# Patient Record
Sex: FEMALE | Race: WHITE | Employment: PART TIME | URBAN - NONMETROPOLITAN AREA
[De-identification: names, ages, dates, MRNs, and addresses within clinical notes are randomized per-mention and may not be internally consistent; named-entity substitution may affect disease eponyms.]

---

## 2017-01-04 ENCOUNTER — OFFICE VISIT (OUTPATIENT)
Dept: CARDIOLOGY | Age: 57
End: 2017-01-04

## 2017-01-04 VITALS
SYSTOLIC BLOOD PRESSURE: 116 MMHG | BODY MASS INDEX: 25.79 KG/M2 | HEIGHT: 65 IN | HEART RATE: 60 BPM | DIASTOLIC BLOOD PRESSURE: 52 MMHG | WEIGHT: 154.8 LBS

## 2017-01-04 DIAGNOSIS — I45.10 RBBB: Primary | ICD-10-CM

## 2017-01-04 DIAGNOSIS — E78.00 PURE HYPERCHOLESTEROLEMIA: ICD-10-CM

## 2017-01-04 DIAGNOSIS — Z98.890 HISTORY OF BACK SURGERY: ICD-10-CM

## 2017-01-04 PROCEDURE — 99213 OFFICE O/P EST LOW 20 MIN: CPT | Performed by: PHYSICIAN ASSISTANT

## 2017-12-21 ENCOUNTER — HOSPITAL ENCOUNTER (OUTPATIENT)
Dept: ULTRASOUND IMAGING | Age: 57
Discharge: HOME | End: 2017-12-21
Payer: COMMERCIAL

## 2017-12-21 DIAGNOSIS — E03.9 ACQUIRED HYPOTHYROIDISM: ICD-10-CM

## 2017-12-21 PROCEDURE — 76536 US EXAM OF HEAD AND NECK: CPT

## 2018-01-05 ENCOUNTER — HOSPITAL ENCOUNTER (OUTPATIENT)
Dept: ULTRASOUND IMAGING | Age: 58
Discharge: HOME | End: 2018-01-05
Payer: COMMERCIAL

## 2018-01-05 DIAGNOSIS — E04.2 MULTINODULAR GOITER: ICD-10-CM

## 2018-01-05 PROCEDURE — 88177 CYTP FNA EVAL EA ADDL: CPT

## 2018-01-05 PROCEDURE — 88173 CYTOPATH EVAL FNA REPORT: CPT

## 2018-01-05 PROCEDURE — 76942 ECHO GUIDE FOR BIOPSY: CPT

## 2018-01-05 PROCEDURE — 88172 CYTP DX EVAL FNA 1ST EA SITE: CPT

## 2021-02-04 ENCOUNTER — HOSPITAL ENCOUNTER (EMERGENCY)
Age: 61
Discharge: HOME | End: 2021-02-04

## 2021-02-04 VITALS
TEMPERATURE: 98.8 F | DIASTOLIC BLOOD PRESSURE: 71 MMHG | HEART RATE: 70 BPM | RESPIRATION RATE: 18 BRPM | SYSTOLIC BLOOD PRESSURE: 154 MMHG | HEIGHT: 65 IN | BODY MASS INDEX: 26.66 KG/M2 | WEIGHT: 160 LBS | OXYGEN SATURATION: 96 %

## 2021-02-04 DIAGNOSIS — R06.02 SHORTNESS OF BREATH: Primary | ICD-10-CM

## 2021-02-04 DIAGNOSIS — Z72.0 TOBACCO ABUSE: ICD-10-CM

## 2021-02-04 DIAGNOSIS — Z20.822 CLOSE EXPOSURE TO COVID-19 VIRUS: ICD-10-CM

## 2021-02-04 PROCEDURE — 99202 OFFICE O/P NEW SF 15 MIN: CPT | Performed by: NURSE PRACTITIONER

## 2021-02-04 PROCEDURE — 99213 OFFICE O/P EST LOW 20 MIN: CPT

## 2021-02-04 RX ORDER — ALBUTEROL SULFATE 90 UG/1
2 AEROSOL, METERED RESPIRATORY (INHALATION) EVERY 6 HOURS PRN
Qty: 1 INHALER | Refills: 0 | Status: SHIPPED | OUTPATIENT
Start: 2021-02-04

## 2021-02-04 ASSESSMENT — ENCOUNTER SYMPTOMS
COUGH: 1
EYE REDNESS: 0
TROUBLE SWALLOWING: 0
DIARRHEA: 0
EYE DISCHARGE: 0
SORE THROAT: 0
SHORTNESS OF BREATH: 1
NAUSEA: 0
RHINORRHEA: 0
VOMITING: 0
ALLERGIC/IMMUNOLOGIC NEGATIVE: 1
EYES NEGATIVE: 1
CHEST TIGHTNESS: 0
GASTROINTESTINAL NEGATIVE: 1
WHEEZING: 0

## 2021-02-04 NOTE — ED TRIAGE NOTES
Pt to [facility] ambulatory with SOB, and cough. Pt  has COVID-19. SOB started on Monday.

## 2021-02-04 NOTE — ED PROVIDER NOTES
[address]       Chief Complaint   Patient presents with    Shortness of Breath    Cough       Nurses Notes reviewed and I agree except as noted in the HPI. HISTORY OF PRESENT ILLNESS   Janel Padilla is a 56 y.o. female who presents with complaints of cough. Onset of symptoms greater than 10 days ago, unchanged. Cough is intermittent, dry. Associated shortness of breath. Shortness of breath worse at night. She states that it has woken her up at times. No excessive daytime sleepiness. No snoring. Denies fever or chest pain. No palpitations or leg swelling. Patient's  recently tested positive for Covid. She states that they completed a 2-week quarantine together. Patient was never tested due to having symptoms suggestive of Covid. Medical history significant for hyperlipidemia, hypertension, and tobacco abuse. Current tobacco user, 0.5 packs/day. 20 pack years. No recent long car/plane rides. No recent procedures. No current treatment for symptoms. REVIEW OF SYSTEMS     Review of Systems   Constitutional: Negative for chills, diaphoresis, fatigue and fever. HENT: Negative for congestion, ear pain, rhinorrhea, sore throat and trouble swallowing. Eyes: Negative for discharge and redness. Respiratory: Positive for cough and shortness of breath. Negative for chest tightness and wheezing. Cardiovascular: Negative for chest pain, palpitations and leg swelling. Gastrointestinal: Negative for diarrhea, nausea and vomiting. Genitourinary: Negative for decreased urine volume. Musculoskeletal: Negative for neck pain and neck stiffness. Skin: Negative for rash. Neurological: Negative for dizziness and headaches. Hematological: Negative for adenopathy. Psychiatric/Behavioral: Positive for sleep disturbance (waking up from sleep).        PAST MEDICAL HISTORY         Diagnosis Date    Chronic back pain  Lumbar stenosis with neurogenic claudication     Nicotine abuse     Secondary hypertension 12/2/2016       SURGICAL HISTORY     Patient  has a past surgical history that includes laminectomy (12/03/2016). CURRENT MEDICATIONS       Discharge Medication List as of 2/4/2021 10:58 AM      CONTINUE these medications which have NOT CHANGED    Details   traMADol (ULTRAM) 50 MG tablet Take 50 mg by mouth every 6 hours as needed for Pain      acetaminophen (TYLENOL) 325 MG tablet Take 650 mg by mouth every 6 hours as needed for Pain             ALLERGIES     Patient is is allergic to dilaudid [hydromorphone hcl]. FAMILY HISTORY     Patient'sfamily history includes Cancer in her father. SOCIAL HISTORY     Patient  reports that she has been smoking cigarettes. She has been smoking about 0.25 packs per day. She has never used smokeless tobacco. She reports that she does not drink alcohol or use drugs. PHYSICAL EXAM     ED TRIAGE VITALS  BP: (!) 154/71, Temp: 98.8 °F (37.1 °C), Pulse: 70, Resp: 18, SpO2: 96 %  Physical Exam  Vitals signs and nursing note reviewed. Constitutional:       General: She is not in acute distress. Appearance: Normal appearance. She is well-developed. She is not ill-appearing, toxic-appearing or diaphoretic. HENT:      Head: Normocephalic and atraumatic. Right Ear: Hearing, tympanic membrane, ear canal and external ear normal. No mastoid tenderness. No hemotympanum. Tympanic membrane is not perforated, erythematous or bulging. Left Ear: Hearing, tympanic membrane, ear canal and external ear normal. No mastoid tenderness. No hemotympanum. Tympanic membrane is not perforated, erythematous or bulging. Nose: Nose normal. No congestion. Mouth/Throat:      Mouth: Mucous membranes are moist.      Pharynx: Oropharynx is clear. Uvula midline. Tonsils: No tonsillar abscesses. Eyes:      General: No scleral icterus.      Conjunctiva/sclera: Conjunctivae normal. Right eye: Right conjunctiva is not injected. No hemorrhage. Left eye: Left conjunctiva is not injected. No hemorrhage. Neck:      Musculoskeletal: Normal range of motion and neck supple. Thyroid: No thyromegaly. Trachea: Trachea normal.   Cardiovascular:      Rate and Rhythm: Normal rate and regular rhythm. No extrasystoles are present. Chest Wall: PMI is not displaced. Heart sounds: Normal heart sounds. No murmur. No friction rub. No gallop. Pulmonary:      Effort: Pulmonary effort is normal. No respiratory distress. Breath sounds: Normal breath sounds. Lymphadenopathy:      Head:      Right side of head: No submental, submandibular, tonsillar or occipital adenopathy. Left side of head: No submental, submandibular, tonsillar or occipital adenopathy. Cervical: No cervical adenopathy. Upper Body:      Right upper body: No supraclavicular adenopathy. Left upper body: No supraclavicular adenopathy. Skin:     General: Skin is warm and dry. Capillary Refill: Capillary refill takes less than 2 seconds. Coloration: Skin is not jaundiced or pale. Findings: No rash. Comments: Skin warm and dry to touch, no rashes noted on exposed surfaces. Neurological:      Mental Status: She is alert and oriented to person, place, and time. She is not disoriented. Psychiatric:         Mood and Affect: Mood normal.         Behavior: Behavior normal. Behavior is cooperative. DIAGNOSTIC RESULTS   Labs: No results found for this visit on 02/04/21. IMAGING:  No orders to display     URGENT CARE COURSE:     Vitals:    02/04/21 1020   BP: (!) 154/71   Pulse: 70   Resp: 18   Temp: 98.8 °F (37.1 °C)   TempSrc: Temporal   SpO2: 96%   Weight: 160 lb (72.6 kg)   Height: 5' 5\" (1.651 m)       Medications - No data to display  PROCEDURES:  None  FINALIMPRESSION      1. Shortness of breath    2. Tobacco abuse    3.  Close exposure to COVID-19 virus DISPOSITION/PLAN   DISPOSITION Decision To Discharge 02/04/2021 10:47:53 AM  Nontoxic, no distress. Exam consistent with shortness of breath, mild. Patient likely had Covid due to recent close exposure/symptoms. Patient does not want tested. No suspicion for PE. Medication as prescribed. Follow-up with PCP if no improvement over the next 1 to 2 weeks. Advised if any distress go to ER. PATIENT REFERRED TO:  JOHNNIE Philippe CNP  [facility]  [phone]    In 1 week  Follow up as needed. Medication as prescribed. If any distress go to ER.     DISCHARGE MEDICATIONS:  Discharge Medication List as of 2/4/2021 10:58 AM      START taking these medications    Details   albuterol sulfate HFA (PROAIR HFA) 108 (90 Base) MCG/ACT inhaler Inhale 2 puffs into the lungs every 6 hours as needed for Wheezing or Shortness of Breath, Disp-1 Inhaler, R-0Print           Discharge Medication List as of 2/4/2021 10:58 AM          [address] Kap[address]ia, JOHNNIE - CNP  02/04/21 1222

## 2021-02-04 NOTE — ED PROVIDER NOTES
[facility]     **This is a Medical/PA/APRN Student Note and is charted for educational purposes. The non-physician staff attested note is not to be used for billing purposes, chart documentation or to guide patient care. Please see the supervising physician/PA/APRN modifications/attestation for treatment plan/chart documentation/suggestions. This note has been reviewed and feedback has been provided to the student. **    CHIEF COMPLAINT       Chief Complaint   Patient presents with    Shortness of Breath    Cough       Nurses Notes reviewed and I agree except as noted in the HPI. HISTORY OF PRESENT ILLNESS   Triston Jones is a 56 y.o. female who presents SOB that awoke her from her sleep last night. Pt denied CP during the incident but did report some palpitations. Pt states she was recently in quarantine with her  who had COVID-19 and developed the same symptoms >10 days ago. She did not receive a COVID-19 test d/t financial constraints. Pt reported she had cough, congestion, and loss of taste & smell. Pt states her symptoms have improved since but she still has some underlying SOB at times. Pt is a 0.5 ppd smoker for 40+ years. Denies CP. Currently denies and cough/fever. The history is provided by the patient. REVIEW OF SYSTEMS     Review of Systems   Constitutional: Positive for appetite change. HENT: Negative. Eyes: Negative. Respiratory: Positive for shortness of breath. Cardiovascular: Negative. Gastrointestinal: Negative. Endocrine: Negative. Genitourinary: Negative. Musculoskeletal: Negative. Skin: Negative. Allergic/Immunologic: Negative. Neurological: Negative. Hematological: Negative. Psychiatric/Behavioral: Negative.         PAST MEDICAL HISTORY         Diagnosis Date    Chronic back pain     Lumbar stenosis with neurogenic claudication     Nicotine abuse     Secondary hypertension 12/2/2016       SURGICALHISTORY     Patient  has a past surgical history that includes laminectomy (12/03/2016). CURRENT MEDICATIONS       Previous Medications    ACETAMINOPHEN (TYLENOL) 325 MG TABLET    Take 650 mg by mouth every 6 hours as needed for Pain    TRAMADOL (ULTRAM) 50 MG TABLET    Take 50 mg by mouth every 6 hours as needed for Pain       ALLERGIES     Patient is is allergic to dilaudid [hydromorphone hcl]. Patients   There is no immunization history on file for this patient. FAMILY HISTORY     Patient's family history includes Cancer in her father. SOCIAL HISTORY     Patient  reports that she has been smoking cigarettes. She has been smoking about 0.25 packs per day. She has never used smokeless tobacco. She reports that she does not drink alcohol or use drugs. PHYSICAL EXAM     ED TRIAGE VITALS  BP: (!) 154/71, Temp: 98.8 °F (37.1 °C), Pulse: 70, Resp: 18, SpO2: 96 %,Estimated body mass index is 26.63 kg/m² as calculated from the following:    Height as of this encounter: 5' 5\" (1.651 m). Weight as of this encounter: 160 lb (72.6 kg). ,No LMP recorded. Patient is postmenopausal.    Physical Exam  Vitals signs and nursing note reviewed. Constitutional:       Appearance: She is well-developed and normal weight. HENT:      Head: Normocephalic. Cardiovascular:      Rate and Rhythm: Normal rate and regular rhythm. Pulmonary:      Effort: Pulmonary effort is normal.      Breath sounds: Normal breath sounds. Abdominal:      Palpations: Abdomen is soft. Musculoskeletal: Normal range of motion. Skin:     General: Skin is warm and dry. Capillary Refill: Capillary refill takes less than 2 seconds. Neurological:      General: No focal deficit present. Mental Status: She is alert. Psychiatric:         Mood and Affect: Mood normal.         Behavior: Behavior normal.         DIAGNOSTIC RESULTS     Labs:No results found for this visit on 02/04/21.     IMAGING:    No orders

## 2023-11-29 RX ORDER — LEVOTHYROXINE SODIUM 0.1 MG/1
100 TABLET ORAL DAILY
COMMUNITY

## 2023-11-29 RX ORDER — GABAPENTIN 300 MG/1
300 CAPSULE ORAL 2 TIMES DAILY
COMMUNITY

## 2023-11-29 RX ORDER — ATORVASTATIN CALCIUM 40 MG/1
40 TABLET, FILM COATED ORAL DAILY
COMMUNITY

## 2023-11-29 NOTE — PROGRESS NOTES
In preparation for their surgical procedure above patient was screened for Obstructive Sleep Apnea (LEAH) using the STOP-Bang Questionnaire by the Pre-Admission Testing department. This is a pre-surgical screening tool for patient safety and serves as a recommendation, this WILL NOT cause cancellation of surgery. STOP-Bang Questionnaire  * Do you currently see a pulmonologist?  No     If yes STOP, do not complete. Patient follows with Dr.     1.  Do you snore loudly (able to be heard in the next room)? No    2. Do you often feel tired or sleepy during the daytime? No       3. Has anyone ever told you that you stop breathing during your sleep? No    4. Do you have or are you being treated for high blood pressure? No      5. BMI more than 35? BMI (Calculated): 26.7        No    6. Age over 48 years? 70 y.o. Yes    7. Neck Circumference greater than 17 inches for male or 16 inches for female? Measured           (visits only)            Not Applicable    8. Gender Male? No      TOTAL SCORE: 1    LEAH - Low Risk : Yes to 0 - 2 questions  LEAH - Intermediate Risk : Yes to 3 - 4 questions  LEAH - High Risk : Yes to 5 - 8 questions    Adapted from:   STOP Questionnaire: A Tool to Screen Patients for Obstructive Sleep Apnea   JUNG Drummond.C.P.C., LYN Woodson.B.B.S., Miguelito Walls M.D., Jennifer Blanco. Lashanda Blackmon, Ph.D., LYN Cervantes.B.B.S., LYN Goff.Sc., Myrtle Patino M.D., Kari Valdez. ZA Valentine.P.C.    Anesthesiology 2008; 494:142-67 Copyright 2008, the [place] [employer], [address] [facility].   ----------------------------------------------------------------------------------------------------------------

## 2023-11-29 NOTE — PROGRESS NOTES
NPO after midnight  Bring eye bag from office  Bring insurance info and drivers license  Wear comfortable clean clothing, button down top  Do not bring jewelry  Shower night before and morning of surgery with a liquid antibacterial soap  Bring list of medications with dosage and how often taken  Follow all instructions given by your physician   needed at discharge  Call [phone] for any questions

## 2023-12-05 NOTE — DISCHARGE INSTRUCTIONS
Cataract Post-Operative Care Instructions     How to Instill you Eye Drops:    Wash your hands before instilling drops   Shake eye drop bottle putting one drop in the surgical eye   The dropper tip should not touch the eyelashes or the eye. Your head should be tilted back, look up and lower the eyelid pulled down from the cheekbone to form a pocket for the eye drop. Daily Eye Drop Schedule:    Remove the eye patch as directed by the recovery room nurse. Apply 1 drop of Ofloxacin three times today, then four times a day starting tomorrow for one week. Lay a warm, clean and moist washcloth for 5 minutes on operative eye. Apply 1 drop of Lotemax two times today, and then starting tomorrow three times a day for one week, two times a day for one week and then once a day for one week. Lay a warm, clean and moist washcloth for 5 minutes on operative eye. Apply 1 drop of Prolensa one time a day starting tomorrow for two weeks. Lay a warm, clean and moist washcloth for 5 minutes on operative eye. Care at Home:     Wear a shield at bedtime for one week following your eye surgery. NEVER RUB YOUR OPERATIVE EYE! Use of the operative eye is NOT harmful. Your vision may be blurry with your present glasses. Take your glasses to your follow-up appointment the day after surgery. You will receive your new glasses prescription approximately 4-5 weeks following surgery. You may do everything to care for yourself. You may sleep on your back or either side after surgery, but NOT FACE DOWN ON YOUR STOMACH. NO LIFTING OVER 10 POUNDS. No outdoor work until your doctor tells you that it is OK. Light work, including stooping over is not harmful. If you have glaucoma, continue your normal use of the glaucoma drops. Do not submerge head under water (Hot tub/swimming pool)    Please call office if any problem arise at [phone] Extension [phone].     I have had the opportunity to ask questions and have

## 2023-12-07 ENCOUNTER — ANESTHESIA (OUTPATIENT)
Dept: OPERATING ROOM | Age: 63
End: 2023-12-07
Payer: MEDICARE

## 2023-12-07 ENCOUNTER — HOSPITAL ENCOUNTER (OUTPATIENT)
Age: 63
Setting detail: OUTPATIENT SURGERY
Discharge: HOME | End: 2023-12-07
Attending: OPHTHALMOLOGY | Admitting: OPHTHALMOLOGY
Payer: MEDICARE

## 2023-12-07 ENCOUNTER — ANESTHESIA EVENT (OUTPATIENT)
Dept: OPERATING ROOM | Age: 63
End: 2023-12-07
Payer: MEDICARE

## 2023-12-07 VITALS
HEART RATE: 58 BPM | HEIGHT: 65 IN | OXYGEN SATURATION: 95 % | BODY MASS INDEX: 28.49 KG/M2 | TEMPERATURE: 97.8 F | RESPIRATION RATE: 16 BRPM | WEIGHT: 171 LBS | DIASTOLIC BLOOD PRESSURE: 63 MMHG | SYSTOLIC BLOOD PRESSURE: 138 MMHG

## 2023-12-07 PROCEDURE — 2720000010 HC SURG SUPPLY STERILE: Performed by: OPHTHALMOLOGY

## 2023-12-07 PROCEDURE — V2632 POST CHMBR INTRAOCULAR LENS: HCPCS | Performed by: OPHTHALMOLOGY

## 2023-12-07 PROCEDURE — 3700000000 HC ANESTHESIA ATTENDED CARE: Performed by: OPHTHALMOLOGY

## 2023-12-07 PROCEDURE — 2709999900 HC NON-CHARGEABLE SUPPLY: Performed by: OPHTHALMOLOGY

## 2023-12-07 PROCEDURE — 3600000003 HC SURGERY LEVEL 3 BASE: Performed by: OPHTHALMOLOGY

## 2023-12-07 PROCEDURE — 6370000000 HC RX 637 (ALT 250 FOR IP): Performed by: OPHTHALMOLOGY

## 2023-12-07 PROCEDURE — 3700000001 HC ADD 15 MINUTES (ANESTHESIA): Performed by: OPHTHALMOLOGY

## 2023-12-07 PROCEDURE — 7100000011 HC PHASE II RECOVERY - ADDTL 15 MIN: Performed by: OPHTHALMOLOGY

## 2023-12-07 PROCEDURE — 6360000002 HC RX W HCPCS: Performed by: OPHTHALMOLOGY

## 2023-12-07 PROCEDURE — 7100000010 HC PHASE II RECOVERY - FIRST 15 MIN: Performed by: OPHTHALMOLOGY

## 2023-12-07 PROCEDURE — 2580000003 HC RX 258

## 2023-12-07 PROCEDURE — 3600000013 HC SURGERY LEVEL 3 ADDTL 15MIN: Performed by: OPHTHALMOLOGY

## 2023-12-07 PROCEDURE — 6360000002 HC RX W HCPCS

## 2023-12-07 PROCEDURE — 2500000003 HC RX 250 WO HCPCS: Performed by: OPHTHALMOLOGY

## 2023-12-07 DEVICE — ACRYSOF(R) IQ ASPHERIC NATURAL IOL, SINGLE-PIECE ACRYLIC FOLDABLE PCL, UV WITH BLUE LIGHTFILTER, 13.0MM LENGTH, 6.0MM ANTERIORASYMMETRIC BICONVEX OPTIC, PLANAR HAPTICS.
Type: IMPLANTABLE DEVICE | Status: FUNCTIONAL
Brand: ACRYSOF®

## 2023-12-07 RX ORDER — SODIUM CHLORIDE 9 MG/ML
INJECTION, SOLUTION INTRAVENOUS CONTINUOUS PRN
Status: DISCONTINUED | OUTPATIENT
Start: 2023-12-07 | End: 2023-12-07 | Stop reason: SDUPTHER

## 2023-12-07 RX ORDER — SODIUM CHLORIDE 9 MG/ML
INJECTION, SOLUTION INTRAVENOUS CONTINUOUS
Status: DISCONTINUED | OUTPATIENT
Start: 2023-12-07 | End: 2023-12-07 | Stop reason: HOSPADM

## 2023-12-07 RX ORDER — BUPIVACAINE HYDROCHLORIDE 7.5 MG/ML
1 INJECTION, SOLUTION EPIDURAL; RETROBULBAR EVERY 5 MIN PRN
Status: COMPLETED | OUTPATIENT
Start: 2023-12-07 | End: 2023-12-07

## 2023-12-07 RX ORDER — BALANCED SALT SOLUTION ENRICHED WITH BICARBONATE, DEXTROSE, AND GLUTATHIONE
KIT INTRAOCULAR PRN
Status: DISCONTINUED | OUTPATIENT
Start: 2023-12-07 | End: 2023-12-07 | Stop reason: ALTCHOICE

## 2023-12-07 RX ORDER — LIDOCAINE HYDROCHLORIDE 10 MG/ML
INJECTION, SOLUTION EPIDURAL; INFILTRATION; INTRACAUDAL; PERINEURAL PRN
Status: DISCONTINUED | OUTPATIENT
Start: 2023-12-07 | End: 2023-12-07 | Stop reason: ALTCHOICE

## 2023-12-07 RX ORDER — MIDAZOLAM HYDROCHLORIDE 1 MG/ML
INJECTION INTRAMUSCULAR; INTRAVENOUS PRN
Status: DISCONTINUED | OUTPATIENT
Start: 2023-12-07 | End: 2023-12-07 | Stop reason: SDUPTHER

## 2023-12-07 RX ADMIN — Medication 3 ML: at 09:13

## 2023-12-07 RX ADMIN — BUPIVACAINE HYDROCHLORIDE 0.38 MG: 7.5 INJECTION, SOLUTION EPIDURAL; RETROBULBAR at 09:18

## 2023-12-07 RX ADMIN — SODIUM CHLORIDE: 9 INJECTION, SOLUTION INTRAVENOUS at 09:58

## 2023-12-07 RX ADMIN — Medication 3 ML: at 09:23

## 2023-12-07 RX ADMIN — MIDAZOLAM 1 MG: 1 INJECTION INTRAMUSCULAR; INTRAVENOUS at 10:01

## 2023-12-07 RX ADMIN — Medication 3 ML: at 09:18

## 2023-12-07 RX ADMIN — BUPIVACAINE HYDROCHLORIDE 0.38 MG: 7.5 INJECTION, SOLUTION EPIDURAL; RETROBULBAR at 09:23

## 2023-12-07 RX ADMIN — MIDAZOLAM 1 MG: 1 INJECTION INTRAMUSCULAR; INTRAVENOUS at 10:04

## 2023-12-07 RX ADMIN — BUPIVACAINE HYDROCHLORIDE 0.38 MG: 7.5 INJECTION, SOLUTION EPIDURAL; RETROBULBAR at 09:13

## 2023-12-07 ASSESSMENT — ENCOUNTER SYMPTOMS: SHORTNESS OF BREATH: 1

## 2023-12-07 ASSESSMENT — LIFESTYLE VARIABLES: SMOKING_STATUS: 1

## 2023-12-07 ASSESSMENT — PAIN - FUNCTIONAL ASSESSMENT: PAIN_FUNCTIONAL_ASSESSMENT: 0-10

## 2023-12-07 NOTE — PROGRESS NOTES
1032: Patient arrived to Phase II recovery via chair. Awake and alert. Report received from Osmany.  present in room. 1034: VSS, patient denies pain to R eye. Shield present. Snack and drink present. 1100: Discharge instructions reviewed with patient and patient's . AVS provided for discharge. IV removed. Patient dressed. 1110: Patient escorted to vehicle and discharged home in stable condition with .

## 2023-12-07 NOTE — H&P
I have examined the patient and reviewed the H&P / Consult and there are no changes to the patient.     Seb Mondragon MD 12/7/20239:16 AM

## 2023-12-07 NOTE — ANESTHESIA POSTPROCEDURE EVALUATION
Department of Anesthesiology  Postprocedure Note    Patient: Hang Ramírez  MRN: [id number]  YOB: 1960  Date of evaluation: 12/7/2023      Procedure Summary       Date: 12/07/23 Room / Location: [address] [place] [address] 04 / [facility]    Anesthesia Start: 7707 Anesthesia Stop: 7049    Procedure: RIGHT EYE CATARACT EMULSIFICATION IOL IMPLANT (Right) Diagnosis:       Other age-related cataract of right eye      (Other age-related cataract of right eye [H25.89])    Surgeons: Devin Young MD Responsible Provider: Braydon Lancaster DO    Anesthesia Type: MAC ASA Status: 3            Anesthesia Type: No value filed.     Ramu Phase I: Ramu Score: 10    Ramu Phase II: Ramu Score: 10      Anesthesia Post Evaluation    Patient location during evaluation: bedside  Patient participation: complete - patient participated  Level of consciousness: awake and alert  Pain score: 0  Airway patency: patent  Nausea & Vomiting: no nausea and no vomiting  Complications: no  Cardiovascular status: hemodynamically stable and blood pressure returned to baseline  Respiratory status: spontaneous ventilation, room air and acceptable  Hydration status: stable  Pain management: adequate and satisfactory to patient

## 2023-12-07 NOTE — PROGRESS NOTES
Proparacaine 2 drops given at start of case. Vigamox 4:1 mixture with BSS 0.1ml given   Mocifloxacin 1 drop given at end of case  Lotemax 1 drop given at end of case  All to right eye.

## 2023-12-07 NOTE — OP NOTE
[facility]  RECORD OF OPERATION                       2023    Patient: Michelle Rasmussen  : 1960  Acct#: [de-identified]    PRE-OPERATIVE DIAGNOSIS:  Cataract, OD    POST-OPERATIVE DIAGNOSIS:  same    OPERATION PERFORMED:  Phacoemulsification cataract extraction with posterior chamber intraocular lens, OD    MODIFIERS: none    SURGEON:  Love Ambrose MD    ANESTHESIA:  Topical/MAC    COMPLICATIONS:  None    LENS INFORMATION: SN60WF +21.0 ([id number])    CDE: 2.15    INDICATION AND CONSENT:  The patient was found to have a visually significant cataract affecting their activities of daily living which is not adequately correctable by a change in spectacles. The risks and options of cataract surgery including observation were discussed. Consent was obtained and the patient requests to proceed. OPERATIVE TECHNIQUE: In the preoperative area, the patient was prepared for surgery including dilation of the operative eye. The patient was then taken to the operating room, the operative eye was prepped and draped in the usual sterile ophthalmic fashion. A final timeout was performed to confirm the correct patient, site, side, lens, and procedure. A lid speculum was inserted. A paracentesis incision was made at the limbus in a position comfortable for the non-dominate hand. 0.2-0.4cc of 1% lidocaine was instilled into the anterior chamber followed by 0.2-0.4cc of Omidria. Viscoelastic was instilled into the anterior chamber. A keratome was used to produce a clear corneal incision at the temporal limbus. A capsulorrhexis-type capsulotomy was performed. Hydrodissection was performed balanced salt solution (BSS). The lens nucleus was phacoemulsified. The lens cortical material was aspirated using the automated irrigation/aspiration unit (I/A). Additional viscoelastic was instilled into the anterior segment and capsular bag.  An intraocular lens was introduced and

## (undated) DEVICE — CLEARCUT® SIDEPORT KNIFE DUAL BEVEL 1.0MM ANGLED: Brand: CLEARCUT®

## (undated) DEVICE — DRESSING TRNSPAR W2XL2.75IN FLM SHT SEMIPERMEABLE WIND

## (undated) DEVICE — Z INACTIVE USE 2735373 APPLICATOR FBR LAIN COT WOOD TIP ECONOMICAL

## (undated) DEVICE — PHACOEMULSIFICATION PACK COMPACT

## (undated) DEVICE — MICROSURGICAL INSTRUMENT HYDRODISSECTION CANNULA 25GA, 8MM BEND: Brand: ALCON

## (undated) DEVICE — MARKER,SKIN,WI/RULER AND LABELS: Brand: MEDLINE

## (undated) DEVICE — CLEARCUT® HP2 SLIT KNIFE INTREPID MICRO-COAXIAL SYSTEM 2.4 DB: Brand: CLEARCUT®; INTREPID

## (undated) DEVICE — GLOVE SURG 7.5 11.7IN BEAD CUF LIGHT BRN SENSICARE LTX FREE

## (undated) DEVICE — EYE PAK* 1040 PLASTIC OPHTHALMIC DRAPE INCISE POUCH: Brand: ALCON EYE-PAK

## (undated) DEVICE — MICROSURGICAL INSTRUMENT ANTERIOR CHAMBER CANNULA 27GA: Brand: ALCON

## (undated) DEVICE — SOLUTION BSS 15ML

## (undated) DEVICE — SYRINGE, LUER SLIP, STERILE, 1ML: Brand: MEDLINE

## (undated) DEVICE — CATARACT PACK: Brand: MEDLINE INDUSTRIES, INC.

## (undated) DEVICE — BETADINE 5% EYE SOL

## (undated) DEVICE — INTREPID® TRANSFORMER IA HP: Brand: INTREPID®